# Patient Record
Sex: MALE | Race: WHITE | NOT HISPANIC OR LATINO
[De-identification: names, ages, dates, MRNs, and addresses within clinical notes are randomized per-mention and may not be internally consistent; named-entity substitution may affect disease eponyms.]

---

## 2020-03-31 ENCOUNTER — APPOINTMENT (OUTPATIENT)
Dept: ORTHOPEDIC SURGERY | Facility: CLINIC | Age: 25
End: 2020-03-31
Payer: COMMERCIAL

## 2020-03-31 VITALS — WEIGHT: 190 LBS | BODY MASS INDEX: 27.2 KG/M2 | HEIGHT: 70 IN

## 2020-03-31 PROBLEM — Z00.00 ENCOUNTER FOR PREVENTIVE HEALTH EXAMINATION: Status: ACTIVE | Noted: 2020-03-31

## 2020-03-31 PROCEDURE — 99203 OFFICE O/P NEW LOW 30 MIN: CPT

## 2020-03-31 RX ORDER — CYCLOBENZAPRINE HYDROCHLORIDE 10 MG/1
10 TABLET, FILM COATED ORAL 3 TIMES DAILY
Qty: 45 | Refills: 2 | Status: ACTIVE | COMMUNITY
Start: 2020-03-31

## 2020-03-31 RX ORDER — IBUPROFEN 200 MG/1
200 TABLET ORAL
Qty: 90 | Refills: 0 | Status: ACTIVE | COMMUNITY
Start: 2020-03-31

## 2020-03-31 NOTE — PHYSICAL EXAM
DNI/Do Not Resuscitate (DNR)/Medical Orders for Life-Sustaining Treatment (MOLST) [de-identified] : PHYSICAL EXAM LEFT  SHOULDER\par \par NORMAL POSTURE \par AROM 140 / 140 / 90 / 30\par PAIN IN LEFT MEDIAL SCAPULAR AREA\par \par SPECIAL TESTING :\par IMPINGEMENT SIGNS NEGATIVE\par \par RC STRENGTH - REPORTEDLY NORMAL\par SENSATION  - REPORTEDLY NORMAL\par \par \par

## 2020-03-31 NOTE — DISCUSSION/SUMMARY
[de-identified] : EMAIL SENT TO PATIENT: [email or web address] \par IBUPROFEN  800 TID\par CYCLOBENZAPRINE AT NIGHT - 1/2 DURING DAY PRN\par COLD OR HEAT AS SYMPTOMS PREFER\par HOME STRETCH AND EXERCISES BID - HANDOUT AND VIDEOS SENT\par CONSIDER TRIGGER POINT INJECTIONS\par [facility] PHYSICAL THERAPY

## 2020-03-31 NOTE — HISTORY OF PRESENT ILLNESS
[Home] : at home, [unfilled] , at the time of the visit. [Other Location: e.g. Home (Enter Location, City,State)___] : at [unfilled] [Patient] : the patient [Self] : self [FreeTextEntry2] : WRITTEN AND VERBAL CONSENT OBTAINED  [de-identified] : MID UPPER BACK PAIN\par 1 WEEK ALMOST- REACHING FOR SOMETHING IN CAR\par INTERMITTENT PAIN\par CAN BE SHARP, PULLING ALONG LEFT MEDIAL SCAPULAR BORDER\par WORSE WITH EXERCISE, STRETCHING ARMS IN FRONT OF BODY, SITTING\par BETTER WITH REST\par OCCASIONALLY RADIATES DOWN ARM \par NO JESUS NUMBNESS OR WEAKNESS\par CYCLOBENZAPRINE PRESCRIBED BY PRIMARY MD HELPFUL AT NIGHT\par NO NUMBNESS OR TINGLING\par NO RADIATING PAIN

## 2020-04-08 ENCOUNTER — APPOINTMENT (OUTPATIENT)
Dept: ORTHOPEDIC SURGERY | Facility: CLINIC | Age: 25
End: 2020-04-08
Payer: COMMERCIAL

## 2020-04-08 PROCEDURE — 20553 NJX 1/MLT TRIGGER POINTS 3/>: CPT

## 2020-04-08 PROCEDURE — 99214 OFFICE O/P EST MOD 30 MIN: CPT | Mod: 25

## 2020-04-08 NOTE — PROCEDURE
[de-identified] : TRIGGER POINT INJECTIONS\par \par Patient has demonstrated limited relief from NSAIDS, rest, exercises / PT, and after discussion of the risks and benefits, the patient elected to proceed with a trigger point injection into the \par LEFT LEVATOR and RHOMBOID x2 \par RIGHT LEVATOR and RHOMBOID x2\par \par  I confirmed no prior adverse reactions, no active infections, and no relevant allergies. \par The skin was prepped in the usual sterile manner. The site was injected with local anesthetic followed by local needling. \par The injection was completed without complication and a bandage was applied.\par  \par The patient tolerated the procedure well and was given post-injection instructions. Cold Tx x 48 hours, analgesics. prn \par Medications: 1.5cc of 1% xylocaine + 1.5cc .25% Bupivicaine + KENALOG 1MG  per site\par

## 2020-04-08 NOTE — ASSESSMENT
[FreeTextEntry1] : POST INJECTION INSTRUCTIONS\par \par COLD THERAPY , ANALGESICS PRN\par \par HOME STRETCHING AND EXERCISES QD REVIWEWED\par \par CONSIDER  P.T.  2 WEEKS AFTER INJECTION \par \par CONSIDER REPEAT TRIGGER INJECTIONS 2 WEEKS \par

## 2020-04-08 NOTE — HISTORY OF PRESENT ILLNESS
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

## 2020-04-08 NOTE — PHYSICAL EXAM
[de-identified] : PHYSICAL EXAM LEFT  AND RIGHT SHOULDER\par \par NORMAL POSTURE \par AROM 140 / 140 / 90 / 30\par PAIN IN BILAT  MEDIAL SCAPULAR AREA \par BILAT LEAVTOR AND RHOMBOID X 2 TRIGGER POINTS \par \par SPECIAL TESTING :\par IMPINGEMENT SIGNS NEGATIVE\par \par RC STRENGTH - REPORTEDLY NORMAL\par SENSATION  - REPORTEDLY NORMAL\par \par \par

## 2020-04-30 ENCOUNTER — APPOINTMENT (OUTPATIENT)
Dept: ORTHOPEDIC SURGERY | Facility: CLINIC | Age: 25
End: 2020-04-30
Payer: COMMERCIAL

## 2020-04-30 DIAGNOSIS — M25.512 PAIN IN LEFT SHOULDER: ICD-10-CM

## 2020-04-30 DIAGNOSIS — M54.9 DORSALGIA, UNSPECIFIED: ICD-10-CM

## 2020-04-30 DIAGNOSIS — M25.511 PAIN IN RIGHT SHOULDER: ICD-10-CM

## 2020-04-30 PROCEDURE — 99213 OFFICE O/P EST LOW 20 MIN: CPT | Mod: 95

## 2020-04-30 NOTE — PHYSICAL EXAM
[de-identified] : PHYSICAL EXAM LEFT  AND RIGHT SHOULDER\par \par NORMAL POSTURE \par \par PAIN IN BILAT  MEDIAL SCAPULAR AREA INTERMITANTLY \par \par \par SPECIAL TESTING :\par IMPINGEMENT SIGNS NEGATIVE\par \par RC STRENGTH - REPORTEDLY NORMAL\par SENSATION  - REPORTEDLY NORMAL\par \par \par  [de-identified] : MRI T SPINE\par MILD DEGENERATIVE CHANGES AND MILD STENOSIS AT T 3-4

## 2020-04-30 NOTE — HISTORY OF PRESENT ILLNESS
[de-identified] : TELE MEDICINE\par FOLLOW UP\par MID UPPER BACK PAIN\par INTERMITTENT FLARE UPS/PAIN\par TRIGGER POINT INJECTIONS 4/8/2020 HELPFUL\par AT HOME EXERCISES HELPFUL\par MRI RESULTS TODAY \par TAKING IBUPROFEN 2 X DAY \par HAS NOT FILLED CYCLOBENZAPRINE \par NO NUMBNESS  IN UE\par

## 2020-04-30 NOTE — DISCUSSION/SUMMARY
[de-identified] : MRI - MILD DEGENERATIVE CHANGES AND MILD STENOSIS AT T 3-4\par \par SLOWLY IMPROVING \par START PT 2 X WEEK\par DAILY HOME EXERCISES\par \par CONSIDER REPEAT TRIGGER POINT INJECTIONS